# Patient Record
Sex: MALE | Race: WHITE | URBAN - METROPOLITAN AREA
[De-identification: names, ages, dates, MRNs, and addresses within clinical notes are randomized per-mention and may not be internally consistent; named-entity substitution may affect disease eponyms.]

---

## 2024-04-09 ENCOUNTER — OFFICE VISIT (OUTPATIENT)
Dept: INTERNAL MEDICINE CLINIC | Facility: CLINIC | Age: 49
End: 2024-04-09
Payer: COMMERCIAL

## 2024-04-09 VITALS
OXYGEN SATURATION: 98 % | HEART RATE: 79 BPM | SYSTOLIC BLOOD PRESSURE: 132 MMHG | BODY MASS INDEX: 27.85 KG/M2 | DIASTOLIC BLOOD PRESSURE: 88 MMHG | WEIGHT: 217 LBS | HEIGHT: 74 IN

## 2024-04-09 DIAGNOSIS — Z00.00 ANNUAL PHYSICAL EXAM: Primary | ICD-10-CM

## 2024-04-09 DIAGNOSIS — G47.33 OSA (OBSTRUCTIVE SLEEP APNEA): ICD-10-CM

## 2024-04-09 DIAGNOSIS — D22.9 MULTIPLE PIGMENTED NEVI: ICD-10-CM

## 2024-04-09 DIAGNOSIS — R03.0 ELEVATED BLOOD PRESSURE READING: ICD-10-CM

## 2024-04-09 DIAGNOSIS — Z13.6 SCREENING FOR HEART DISEASE: ICD-10-CM

## 2024-04-09 DIAGNOSIS — Z80.42 FAMILY HISTORY OF PROSTATE CANCER: ICD-10-CM

## 2024-04-09 PROCEDURE — 3008F BODY MASS INDEX DOCD: CPT | Performed by: INTERNAL MEDICINE

## 2024-04-09 PROCEDURE — 99386 PREV VISIT NEW AGE 40-64: CPT | Performed by: INTERNAL MEDICINE

## 2024-04-09 PROCEDURE — 90471 IMMUNIZATION ADMIN: CPT | Performed by: INTERNAL MEDICINE

## 2024-04-09 PROCEDURE — 3075F SYST BP GE 130 - 139MM HG: CPT | Performed by: INTERNAL MEDICINE

## 2024-04-09 PROCEDURE — 3079F DIAST BP 80-89 MM HG: CPT | Performed by: INTERNAL MEDICINE

## 2024-04-09 PROCEDURE — 90715 TDAP VACCINE 7 YRS/> IM: CPT | Performed by: INTERNAL MEDICINE

## 2024-04-09 NOTE — PROGRESS NOTES
Nader Guthrie is a 48 year old male.    Chief complaint: annual physical exam       HPI:     Nader Guthrie is a 48 year old pleasant male who presents for annual physical exam         No chest pain no sob no abdominal pain  No diarrhea or constipation   No fever or chills   No urinary complaints        Had a colonoscopy done over 3 years ago   Was found to have polyps   Was advised to repeat in 3 years         Palpitations at times         JS   Had testing done 2020   Was found to have mild sleep apnea   Not on cpap machine   Did loose weight since that time             No smoking   Alcohol : 1-2 times per week   Exercise : 6 days per week       Family history  Dad : Prostate cancer   Mom  of breast cancer  Breast cancer on mom's side     Dad had cardiac issues he is going through mitral valve assessment / evaluation of valve surgery         No current outpatient medications on file.      History reviewed. No pertinent past medical history.  History reviewed. No pertinent surgical history.          Family History   Problem Relation Age of Onset    Cancer Father     Cancer Mother     Cancer Paternal Grandmother      There is no problem list on file for this patient.      REVIEW OF SYSTEMS:   A comprehensive 10 point review of systems was completed.  Pertinent positives and negatives noted in the the HPI            EXAM:   /88   Pulse 79   Ht 6' 2\" (1.88 m)   Wt 217 lb (98.4 kg)   SpO2 98%   BMI 27.86 kg/m²   GENERAL: well developed, well nourished,in no apparent distress  SKIN: multiple pigmented nevi   HEENT: atraumatic, normocephalic,ears and throat are clear  NECK: supple,no adenopathy  LUNGS: clear to auscultation  CARDIO: RRR without murmur  GI: no masses, HSM or tenderness  EXTREMITIES: no cyanosis, clubbing or edema  NEURO: no gross deficits              No orders of the defined types were placed in this encounter.    No results found.         ASSESSMENT AND PLAN:       ICD-10-CM    1. Annual  physical exam  Z00.00 CBC With Differential With Platelet     Comp Metabolic Panel (14)     Lipid Panel     Hemoglobin A1C     TSH W Reflex To Free T4     Vitamin D     PSA (Screening) [E]     TdaP (Adacel, Boostrix) [[id number]]     CT CALCIUM SCORING     Pulmonary Referral - In Network     Genetic Counselor Referral - Douglas Drummond)     Derm Referral - In Network     Gastro Referral - In Network     EKG 12 Lead to be performed at [facility]     Urinalysis with Culture Reflex [E]      2. Family history of prostate cancer  Z80.42 CBC With Differential With Platelet     Comp Metabolic Panel (14)     Lipid Panel     Hemoglobin A1C     TSH W Reflex To Free T4     Vitamin D     PSA (Screening) [E]     TdaP (Adacel, Boostrix) [[id number]]     CT CALCIUM SCORING     Pulmonary Referral - In Network     Genetic Counselor Referral - Douglas Drummond)     Derm Referral - In Network     Gastro Referral - In Network     EKG 12 Lead to be performed at [facility]     Urinalysis with Culture Reflex [E]      3. Multiple pigmented nevi  D22.9 CBC With Differential With Platelet     Comp Metabolic Panel (14)     Lipid Panel     Hemoglobin A1C     TSH W Reflex To Free T4     Vitamin D     PSA (Screening) [E]     TdaP (Adacel, Boostrix) [[id number]]     CT CALCIUM SCORING     Pulmonary Referral - In Network     Genetic Counselor Referral - Douglas Drummond)     Derm Referral - In Network     Gastro Referral - In Network     EKG 12 Lead to be performed at [facility]     Urinalysis with Culture Reflex [E]      4. Screening for heart disease  Z13.6 CBC With Differential With Platelet     Comp Metabolic Panel (14)     Lipid Panel     Hemoglobin A1C     TSH W Reflex To Free T4     Vitamin D     PSA (Screening) [E]     TdaP (Adacel, Boostrix) [[id number]]     CT CALCIUM SCORING     Pulmonary Referral - In Network     Genetic Counselor Referral - Douglas Drummond)     Derm  Referral - In Network     Gastro Referral - In Network     EKG 12 Lead to be performed at [facility]     Urinalysis with Culture Reflex [E]      5. Elevated blood pressure reading  R03.0 CBC With Differential With Platelet     Comp Metabolic Panel (14)     Lipid Panel     Hemoglobin A1C     TSH W Reflex To Free T4     Vitamin D     PSA (Screening) [E]     TdaP (Adacel, Boostrix) [[id number]]     CT CALCIUM SCORING     Pulmonary Referral - In Network     Genetic Counselor Referral - [facility] (Adry Drummond)     Derm Referral - In Network     Gastro Referral - In Network     EKG 12 Lead to be performed at [facility]     Urinalysis with Culture Reflex [E]      6. JS (obstructive sleep apnea)  G47.33 CBC With Differential With Platelet     Comp Metabolic Panel (14)     Lipid Panel     Hemoglobin A1C     TSH W Reflex To Free T4     Vitamin D     PSA (Screening) [E]     TdaP (Adacel, Boostrix) [[id number]]     CT CALCIUM SCORING     Pulmonary Referral - In Network     Genetic Counselor Referral - Artemus (Adry Drummond)     Derm Referral - In Network     Gastro Referral - In Network     EKG 12 Lead to be performed at [facility]     Urinalysis with Culture Reflex [E]       Diet and exercise   Sun screen recommended   Fasting blood work   Tdap today   Referral to gi for screening colonoscopy   Referral to derm for general skin screening   Referral to sleep specialist for revaluation of JS   Advised to check BP daily and send me the log in 1 week   Can bring his machine to compare to manual blood pressure check   CT calcium score recommended   He is interested in genetic testing : will refer to genetic counselor       Please return to the clinic if you are having persistent symptoms. If worsening symptoms should go to the ER    Rebecca Montiel MD,   Diplomate of the American Board of Internal Medicine  Diplomate of the American Board of Obesity Medicine

## 2024-06-24 ENCOUNTER — LAB ENCOUNTER (OUTPATIENT)
Dept: LAB | Age: 49
End: 2024-06-24
Attending: INTERNAL MEDICINE

## 2024-06-24 ENCOUNTER — LAB ENCOUNTER (OUTPATIENT)
Dept: LAB | Facility: HOSPITAL | Age: 49
End: 2024-06-24
Attending: INTERNAL MEDICINE

## 2024-06-24 DIAGNOSIS — R79.89 ABNORMAL CBC: ICD-10-CM

## 2024-06-24 DIAGNOSIS — Z00.00 ANNUAL PHYSICAL EXAM: ICD-10-CM

## 2024-06-24 DIAGNOSIS — R17 ELEVATED BILIRUBIN: ICD-10-CM

## 2024-06-24 DIAGNOSIS — R03.0 ELEVATED BLOOD PRESSURE READING: ICD-10-CM

## 2024-06-24 DIAGNOSIS — D22.9 MULTIPLE PIGMENTED NEVI: ICD-10-CM

## 2024-06-24 DIAGNOSIS — G47.33 OSA (OBSTRUCTIVE SLEEP APNEA): ICD-10-CM

## 2024-06-24 DIAGNOSIS — Z13.6 SCREENING FOR HEART DISEASE: ICD-10-CM

## 2024-06-24 DIAGNOSIS — Z80.42 FAMILY HISTORY OF PROSTATE CANCER: ICD-10-CM

## 2024-06-24 DIAGNOSIS — Z00.00 ROUTINE GENERAL MEDICAL EXAMINATION AT A HEALTH CARE FACILITY: ICD-10-CM

## 2024-06-24 DIAGNOSIS — Z00.00 ROUTINE GENERAL MEDICAL EXAMINATION AT A HEALTH CARE FACILITY: Primary | ICD-10-CM

## 2024-06-24 LAB
ALBUMIN SERPL-MCNC: 4.6 G/DL (ref 3.2–4.8)
ALBUMIN/GLOB SERPL: 1.9 {RATIO} (ref 1–2)
ALP LIVER SERPL-CCNC: 42 U/L
ALT SERPL-CCNC: 13 U/L
ANION GAP SERPL CALC-SCNC: 4 MMOL/L (ref 0–18)
AST SERPL-CCNC: 19 U/L (ref ?–34)
ATRIAL RATE: 64 BPM
BASOPHILS # BLD AUTO: 0.04 X10(3) UL (ref 0–0.2)
BASOPHILS NFR BLD AUTO: 1.3 %
BILIRUB DIRECT SERPL-MCNC: 0.9 MG/DL (ref ?–0.3)
BILIRUB SERPL-MCNC: 2.7 MG/DL (ref 0.3–1.2)
BILIRUB UR QL: NEGATIVE
BUN BLD-MCNC: 13 MG/DL (ref 9–23)
BUN/CREAT SERPL: 13.1 (ref 10–20)
CALCIUM BLD-MCNC: 9.4 MG/DL (ref 8.7–10.4)
CHLORIDE SERPL-SCNC: 109 MMOL/L (ref 98–112)
CHOLEST SERPL-MCNC: 180 MG/DL (ref ?–200)
CLARITY UR: CLEAR
CO2 SERPL-SCNC: 27 MMOL/L (ref 21–32)
COMPLEXED PSA SERPL-MCNC: 1.11 NG/ML (ref ?–4)
CREAT BLD-MCNC: 0.99 MG/DL
DEPRECATED RDW RBC AUTO: 43.7 FL (ref 35.1–46.3)
EGFRCR SERPLBLD CKD-EPI 2021: 93 ML/MIN/1.73M2 (ref 60–?)
EOSINOPHIL # BLD AUTO: 0.12 X10(3) UL (ref 0–0.7)
EOSINOPHIL NFR BLD AUTO: 3.8 %
ERYTHROCYTE [DISTWIDTH] IN BLOOD BY AUTOMATED COUNT: 12.6 % (ref 11–15)
EST. AVERAGE GLUCOSE BLD GHB EST-MCNC: 91 MG/DL (ref 68–126)
FASTING PATIENT LIPID ANSWER: YES
FASTING STATUS PATIENT QL REPORTED: YES
GLOBULIN PLAS-MCNC: 2.4 G/DL (ref 2–3.5)
GLUCOSE BLD-MCNC: 84 MG/DL (ref 70–99)
GLUCOSE UR-MCNC: NORMAL MG/DL
HBA1C MFR BLD: 4.8 % (ref ?–5.7)
HCT VFR BLD AUTO: 40.7 %
HDLC SERPL-MCNC: 64 MG/DL (ref 40–59)
HGB BLD-MCNC: 14.1 G/DL
HGB UR QL STRIP.AUTO: NEGATIVE
IMM GRANULOCYTES # BLD AUTO: 0.01 X10(3) UL (ref 0–1)
IMM GRANULOCYTES NFR BLD: 0.3 %
KETONES UR-MCNC: NEGATIVE MG/DL
LDLC SERPL CALC-MCNC: 106 MG/DL (ref ?–100)
LEUKOCYTE ESTERASE UR QL STRIP.AUTO: NEGATIVE
LYMPHOCYTES # BLD AUTO: 1.16 X10(3) UL (ref 1–4)
LYMPHOCYTES NFR BLD AUTO: 36.7 %
MCH RBC QN AUTO: 32.6 PG (ref 26–34)
MCHC RBC AUTO-ENTMCNC: 34.6 G/DL (ref 31–37)
MCV RBC AUTO: 94.2 FL
MONOCYTES # BLD AUTO: 0.33 X10(3) UL (ref 0.1–1)
MONOCYTES NFR BLD AUTO: 10.4 %
NEUTROPHILS # BLD AUTO: 1.5 X10 (3) UL (ref 1.5–7.7)
NEUTROPHILS # BLD AUTO: 1.5 X10(3) UL (ref 1.5–7.7)
NEUTROPHILS NFR BLD AUTO: 47.5 %
NITRITE UR QL STRIP.AUTO: NEGATIVE
NONHDLC SERPL-MCNC: 116 MG/DL (ref ?–130)
OSMOLALITY SERPL CALC.SUM OF ELEC: 289 MOSM/KG (ref 275–295)
P AXIS: 21 DEGREES
P-R INTERVAL: 146 MS
PH UR: 6 [PH] (ref 5–8)
PLATELET # BLD AUTO: 200 10(3)UL (ref 150–450)
POTASSIUM SERPL-SCNC: 4.4 MMOL/L (ref 3.5–5.1)
PROT SERPL-MCNC: 7 G/DL (ref 5.7–8.2)
PROT UR-MCNC: NEGATIVE MG/DL
Q-T INTERVAL: 430 MS
QRS DURATION: 90 MS
QTC CALCULATION (BEZET): 443 MS
R AXIS: 28 DEGREES
RBC # BLD AUTO: 4.32 X10(6)UL
SODIUM SERPL-SCNC: 140 MMOL/L (ref 136–145)
SP GR UR STRIP: 1.01 (ref 1–1.03)
T AXIS: 66 DEGREES
TRIGL SERPL-MCNC: 52 MG/DL (ref 30–149)
TSI SER-ACNC: 4.65 MIU/ML (ref 0.55–4.78)
UROBILINOGEN UR STRIP-ACNC: NORMAL
VENTRICULAR RATE: 64 BPM
VIT D+METAB SERPL-MCNC: 32.2 NG/ML (ref 30–100)
VLDLC SERPL CALC-MCNC: 9 MG/DL (ref 0–30)
WBC # BLD AUTO: 3.2 X10(3) UL (ref 4–11)

## 2024-06-24 PROCEDURE — 84153 ASSAY OF PSA TOTAL: CPT | Performed by: INTERNAL MEDICINE

## 2024-06-24 PROCEDURE — 83036 HEMOGLOBIN GLYCOSYLATED A1C: CPT | Performed by: INTERNAL MEDICINE

## 2024-06-24 PROCEDURE — 82306 VITAMIN D 25 HYDROXY: CPT | Performed by: INTERNAL MEDICINE

## 2024-06-24 PROCEDURE — 93005 ELECTROCARDIOGRAM TRACING: CPT

## 2024-06-24 PROCEDURE — 93010 ELECTROCARDIOGRAM REPORT: CPT | Performed by: INTERNAL MEDICINE

## 2024-06-24 PROCEDURE — 80061 LIPID PANEL: CPT | Performed by: INTERNAL MEDICINE

## 2024-06-24 PROCEDURE — 81003 URINALYSIS AUTO W/O SCOPE: CPT | Performed by: INTERNAL MEDICINE

## 2024-06-24 PROCEDURE — 82248 BILIRUBIN DIRECT: CPT | Performed by: INTERNAL MEDICINE

## 2024-06-24 PROCEDURE — 80050 GENERAL HEALTH PANEL: CPT | Performed by: INTERNAL MEDICINE

## 2024-06-25 DIAGNOSIS — R74.8 ELEVATED LIVER ENZYMES: Primary | ICD-10-CM

## 2024-06-25 DIAGNOSIS — R79.89 ABNORMAL CBC: ICD-10-CM

## 2024-06-27 ENCOUNTER — NURSE ONLY (OUTPATIENT)
Dept: HEMATOLOGY/ONCOLOGY | Facility: HOSPITAL | Age: 49
End: 2024-06-27
Attending: GENETIC COUNSELOR, MS

## 2024-06-27 ENCOUNTER — GENETICS ENCOUNTER (OUTPATIENT)
Dept: GENETICS | Facility: HOSPITAL | Age: 49
End: 2024-06-27
Attending: GENETIC COUNSELOR, MS

## 2024-06-27 ENCOUNTER — APPOINTMENT (OUTPATIENT)
Dept: GENETICS | Facility: HOSPITAL | Age: 49
End: 2024-06-27
Attending: GENETIC COUNSELOR, MS
Payer: COMMERCIAL

## 2024-06-27 DIAGNOSIS — Z80.3 FAMILY HISTORY OF BREAST CANCER IN MOTHER: Primary | ICD-10-CM

## 2024-06-27 DIAGNOSIS — Z80.42 FAMILY HISTORY OF PROSTATE CANCER: ICD-10-CM

## 2024-06-27 DIAGNOSIS — Z84.81 FAMILY HISTORY OF GENETIC DISEASE CARRIER: ICD-10-CM

## 2024-06-27 PROCEDURE — 36415 COLL VENOUS BLD VENIPUNCTURE: CPT

## 2024-06-27 PROCEDURE — 96040 HC GENETIC COUNSELING EA 30 MIN: CPT | Performed by: GENETIC COUNSELOR, MS

## 2024-06-27 NOTE — PROGRESS NOTES
Reason for visit: Mr. Guthrie is a 48-year-old gentleman with a family history of cancer on both sides of his family.  He shares that his maternal aunts were reportedly found to be positive for a BRCA mutation through genetic testing due to the family history of cancer. He is now interested in pursuing genetic testing for the familial mutation in order to guide his medical care and for information for his daughter. Mr. 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  He has two teen-aged children.   Relevant family history: Mr. Guthrie explains that his mother was diagnosed with breast cancer at age 34 and passed away at age 36.  Her two sisters reportedly underwent genetic testing with positive results within one of the BRCA genes.  He does not have access to either of his maternal aunts' test reports.  Both of these aunts have reportedly undergone risk-reducing surgeries.  Other cancers on his maternal side include his maternal grandfather with throat cancer at age 90 (he was a non-smoker) and his maternal grandmother (unknown type).  On his paternal side, his father was diagnosed with prostate cancer at age 76 and more recently, at age 79, with leukemia.  His paternal grandmother was diagnosed with an unknown malignancy.  He is unaware of any other malignancies on either side of her family and is unaware of other family members who have undergone genetic testing to date.   His heritage on his paternal side is [place]  and on his maternal side is  and partial Ashkenazi Hindu heritage.  He denies any knowledge of consanguinity. His children (a daughter and a son) are healthy and well by his report.  Medical history: Mr. Guthrie reports that overall he is in good health. He has had a colonoscopy with several polyps identified and is aware that he is overdue for another one.  His PSA results have been normal to date.  He denies any history of thyroid issues or current dermatological  concerns. He denies any tobacco use but admits to consumption of 5-10 alcoholic beverages weekly.    Summary:   We discussed hereditary breast cancer with Mr. uGthrie because of his family history. Most breast or ovarian cancer is not hereditary; however, hereditary cancer is suspected under certain conditions, such as when breast cancer occurs prior to the age of 50 (or premenopausal), when there are multiple affected family members, when breast cancer occurs in combination with other cancers, especially ovarian cancer, and when cancer appears to be passing from generation to generation, or when an individual has more than one cancer diagnosed.  We discussed dominant inheritance, the pattern in which most hereditary cancer conditions are inherited. If an individual has such a cancer predisposing gene mutation, there is a 50% chance that any offspring will not inherit the mutation and a 50% chance that he or she will inherit it. Inheriting the mutation does not automatically mean that one will develop cancer, rather that there is an increased chance for developing certain types of cancer. Cancer predisposing gene mutations can exist in males and females and can be passed on to both male and female offspring. Given the available information about his maternal aunt with a known BRCA mutation, this gives him a 25% chance of being a carrier of the same mutation.   The pros and cons of cancer predisposing gene mutation testing were reviewed with Mr. Guthrie. Genetic test results can have significant impact on medical management, planning, screening, surgical decision-making, cancer risk assessment for the patient and relatives, and psychological/emotional well-being. Mutations in the genes BRCA1 and BRCA2 account for most (but not all) cases of hereditary breast cancer. Mutations in other genes have also been associated with an increased risk for breast or ovarian cancer - but mutations in these other genes are  statistically less often seen in hereditary breast or ovarian cancer.   We discussed the benefits and limitations of testing for only the known familial BRCA2 mutation vs. comprehensive BRCA1/2 testing versus multi-gene panels that include BRCA1/2. Panels are an appropriate option for individuals whose history is suggestive of more than one syndrome, and they improve detection rate for identifying the underlying cause of a hereditary cancer. Limitations of panels include an unknown percentage of variants of unknown significance, as well as an uncertainty of level of risk associated with certain low-penetrance genes, and therefore lack of clear guidelines for risk management of carriers of some of these mutations. There are panels that assess for mutations in only “high risk” and clinically actionable breast cancer genes as well as larger panels that assess for mutations in high, moderate and unknown-penetrance breast cancer genes. Genetic testing for either comprehensive BRCA1/2 analysis or a panel could yield one of three results: no mutation detected, deleterious mutation detected, or variant of uncertain significance. The implications of these potential results were reviewed with Mr. Guthrie. Conversely, testing for only the known familial mutation would yield either a positive (mutation detected) or negative (no mutation detected) result and gives less expensive, faster results. I encouraged her to attempt to obtain a copy of his maternal aunt's genetic testing report and reviewed the benefits of this, as it allows for complete interpretation of his results.  He is uncertain that he will be able to obtain this but will attempt it.    Additionally, we discussed the federal statutes protecting genetic information and non-discrimination for health insurance or employment (BUDDY) but we reviewed that life insurance, long term healthcare and disability benefits are not covered by these laws, and therefore, should he  be interested in obtaining coverage for any of these, he should do this prior to the testing.  Given the reported familial BRCA mutation as well as the early-onset breast cancer  and Ashkenazi Mosque heritage in his maternal family history, Mr. Guthrie does meet NCCN criteria for genetic testing.  After discussing the multiple testing options, Mr. Guthrie decided that he would like to pursue testing for a multi-gene panel as he is not certain of the gene his maternal aunt has a mutation in.  Blood was drawn and sent to [facility] for a comprehensive panel of genes with RNA analysis that have clinical management guidelines ([facility] Common Hereditary Cancers Panel+RNA).  Turn-around-time is approximately two to three weeks for the testing. Our office will call Mr. Guthrie as soon as results are received; post-test counseling can be scheduled at that time.  Plan:  Ordered [facility] Common Hereditary Cancers Panel+RNA (48 genes) through [facility].  The Genetics office will call Mr. Guthrie when results are received. Post-test counseling can be scheduled at that time.  Recommendations for Mr. Guthrie and family members will depend on above test results.    Thank you for referring Mr. Guthrie for genetic counseling; please do not hesitate to contact our office if you have any questions or concerns, [phone].  Time spent with patient: 50 minutes  CC: Dr. Montiel

## 2024-07-04 ENCOUNTER — TELEPHONE (OUTPATIENT)
Dept: GENETICS | Facility: HOSPITAL | Age: 49
End: 2024-07-04

## 2024-07-04 NOTE — TELEPHONE ENCOUNTER
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 Released results to him through lab's portal and will mail him a copy. Encouraged him to reach out to me with any questions and shared my contact information.

## 2024-07-08 ENCOUNTER — HOSPITAL ENCOUNTER (OUTPATIENT)
Dept: ULTRASOUND IMAGING | Age: 49
Discharge: HOME | End: 2024-07-08
Attending: INTERNAL MEDICINE
Payer: COMMERCIAL

## 2024-07-08 DIAGNOSIS — R74.8 ELEVATED LIVER ENZYMES: ICD-10-CM

## 2024-07-08 PROCEDURE — 76705 ECHO EXAM OF ABDOMEN: CPT | Performed by: INTERNAL MEDICINE

## 2024-08-15 ENCOUNTER — HOSPITAL ENCOUNTER (OUTPATIENT)
Dept: CT IMAGING | Facility: HOSPITAL | Age: 49
Discharge: HOME | End: 2024-08-15
Attending: INTERNAL MEDICINE

## 2024-08-15 ENCOUNTER — ANCILLARY ORDERS (OUTPATIENT)
Dept: INTERNAL MEDICINE CLINIC | Facility: CLINIC | Age: 49
End: 2024-08-15

## 2024-08-15 VITALS
BODY MASS INDEX: 27.85 KG/M2 | WEIGHT: 217 LBS | DIASTOLIC BLOOD PRESSURE: 80 MMHG | SYSTOLIC BLOOD PRESSURE: 150 MMHG | HEIGHT: 74.02 IN

## 2024-08-15 DIAGNOSIS — Z80.42 FAMILY HISTORY OF PROSTATE CANCER: ICD-10-CM

## 2024-08-15 DIAGNOSIS — Z00.00 ANNUAL PHYSICAL EXAM: Primary | ICD-10-CM

## 2024-08-15 DIAGNOSIS — D22.9 MULTIPLE PIGMENTED NEVI: ICD-10-CM

## 2024-08-15 DIAGNOSIS — R03.0 ELEVATED BLOOD PRESSURE READING: ICD-10-CM

## 2024-08-15 DIAGNOSIS — G47.33 OSA (OBSTRUCTIVE SLEEP APNEA): ICD-10-CM

## 2024-08-15 DIAGNOSIS — Z00.00 ANNUAL PHYSICAL EXAM: ICD-10-CM

## 2024-08-15 DIAGNOSIS — Z13.6 SCREENING FOR HEART DISEASE: ICD-10-CM

## 2024-08-15 DIAGNOSIS — R93.1 ELEVATED CORONARY ARTERY CALCIUM SCORE: ICD-10-CM

## 2024-08-15 NOTE — PROGRESS NOTES
Date of Service 8/15/2024    FRANCI VALDEZ  Date of Birth 11/17/1975    Patient Age: 48 year old    PCP: Rebecca Montiel MD  [address] [place] [id number]    Heart Scan Consult  Preliminary Heart Scan Score: 299    Previous Screening  Heart Scan Completed Previously: No        Peripheral Vascular Scan Completed Previously: No          Risk Factors  Personal Risk Factors  Non-alterable Risk Factors: Age;Gender;Family History (Maternal grandfather had bypass surgery later in life.)  Alterable Risk Factors: High Blood Pressure;Abnormal Cholesterol      Body Mass Index  Body mass index is 27.85 kg/m².    Blood Pressure     /80 (BP Location: Right arm)     (Normal =< 120/80,  Elevated = 120-129/ >80,  High Stage1 130-139/80-89 , Stage2 >140/>90)    Lipid Profile  Cholesterol: 180, done on 6/24/2024.  HDL Cholesterol: 64, done on 6/24/2024.  LDL Cholesterol: 106, done on 6/24/2024.  TriGlycerides 52, done on 6/24/2024.    Cholesterol Goals  Value   Total  =< 200   HDL  = > 45 Men = > 55 Women   LDL   =< 100   Triglycerides  =< 150       Glucose and Hemoglobin A1C  Lab Results   Component Value Date    GLU 84 06/24/2024    A1C 4.8 06/24/2024     (Normal Fasting Glucose < 100mg/dl )    Nurse Review  Risk factor information and results reviewed with Nurse: Yes    Recommended Follow Up:  Consult your physician regarding:: Final Heart Scan Report;Discuss potential for Incidental Finding;Discuss Potential for Score Variance      Recommendations for Change:  Nutrition Changes: Low Saturated Fat;Low Fat Dairy;Low Salt Eating;Increase Fiber (Eats beef about 3 times per week, chicken other days. Encouraged to switch out some beef for fish and lean proteins.)    Cholesterol Modification (goal of therapy depends upon your risk): Decrease LDL (Lousy/Bad) Ideal <100    Exercise: No Change Needed (Swims, weight training.)    Smoking Cessation: No Change Needed    Weight Management: Maintain Current Weight    Stress  Management: Adopt Stress Management Techniques    Repeat Heart Scan: Discuss with your Physician;3 Years if Calcium Score is > 0.0              [place] Recommended Resources:  Recommended Resources: Upcoming Classes, Medical Services and Health Library [email or web address]            Adriana HAWKINS RN        Please Contact the Nurse Heart Line with any Questions or Concerns [phone].

## 2024-08-20 DIAGNOSIS — Z82.49 FAMILY HISTORY OF HEART DISEASE: ICD-10-CM

## 2024-08-20 DIAGNOSIS — R93.1 ELEVATED CORONARY ARTERY CALCIUM SCORE: Primary | ICD-10-CM

## 2024-08-20 RX ORDER — LOSARTAN POTASSIUM 25 MG/1
25 TABLET ORAL DAILY
Qty: 90 TABLET | Refills: 1 | Status: SHIPPED | OUTPATIENT
Start: 2024-08-20 | End: 2024-11-18

## 2024-08-20 RX ORDER — ROSUVASTATIN CALCIUM 20 MG/1
20 TABLET, COATED ORAL NIGHTLY
Qty: 90 TABLET | Refills: 1 | Status: SHIPPED | OUTPATIENT
Start: 2024-08-20 | End: 2024-11-18

## 2024-08-28 ENCOUNTER — TELEPHONE (OUTPATIENT)
Facility: CLINIC | Age: 49
End: 2024-08-28

## 2024-08-28 ENCOUNTER — OFFICE VISIT (OUTPATIENT)
Facility: CLINIC | Age: 49
End: 2024-08-28

## 2024-08-28 VITALS
SYSTOLIC BLOOD PRESSURE: 145 MMHG | HEIGHT: 74 IN | DIASTOLIC BLOOD PRESSURE: 104 MMHG | HEART RATE: 73 BPM | BODY MASS INDEX: 27.85 KG/M2 | WEIGHT: 217 LBS

## 2024-08-28 DIAGNOSIS — K64.9 HEMORRHOIDS, UNSPECIFIED HEMORRHOID TYPE: ICD-10-CM

## 2024-08-28 DIAGNOSIS — Z12.11 COLON CANCER SCREENING: Primary | ICD-10-CM

## 2024-08-28 DIAGNOSIS — Z86.010 PERSONAL HISTORY OF COLONIC POLYPS: ICD-10-CM

## 2024-08-28 DIAGNOSIS — Z12.11 SCREEN FOR COLON CANCER: Primary | ICD-10-CM

## 2024-08-28 DIAGNOSIS — Z86.010 HISTORY OF COLON POLYPS: ICD-10-CM

## 2024-08-28 PROCEDURE — 3077F SYST BP >= 140 MM HG: CPT

## 2024-08-28 PROCEDURE — 3080F DIAST BP >= 90 MM HG: CPT

## 2024-08-28 PROCEDURE — 3008F BODY MASS INDEX DOCD: CPT

## 2024-08-28 PROCEDURE — S0285 CNSLT BEFORE SCREEN COLONOSC: HCPCS

## 2024-08-28 RX ORDER — SODIUM, POTASSIUM,MAG SULFATES 17.5-3.13G
SOLUTION, RECONSTITUTED, ORAL ORAL
Qty: 1 EACH | Refills: 0 | Status: SHIPPED | OUTPATIENT
Start: 2024-08-28

## 2024-08-28 RX ORDER — HYDROCORTISONE ACETATE 25 MG/1
25 SUPPOSITORY RECTAL 2 TIMES DAILY
Qty: 28 SUPPOSITORY | Refills: 0 | Status: SHIPPED | OUTPATIENT
Start: 2024-08-28 | End: 2024-09-11

## 2024-08-28 NOTE — PATIENT INSTRUCTIONS
1. Schedule colonoscopy with Dr. Flowers  Diagnosis: CRC screen, personal hx of colon polyps, hemorrhoids  Sedation: MAC or IV  Prep: split dose suprep    2.  bowel prep from pharmacy   You can pick the bowel prep up now and store in a cool, dry place in your home until your scheduled bowel prep start date.    3. Continue all medications as normal for your procedure. DO NOT TAKE: Any form of alcohol, recreational drugs and any forms of erectile dysfunction medications 24 hours prior to procedure.    4. Read all bowel prep instructions carefully. Bowel prep instructions can also be found online at:  [email or web address]     5. AVOID seeds, nuts, popcorn, raw fruits and vegetables for 5 days before procedure    6. If you start any NEW medication after your visit today, please notify us. Certain medications (like iron or weight loss medications) will need to be held before the procedure, or the procedure cannot be performed safely.     -------------------------------------------------------------------------------------------------  -hydrocortisone suppositories 2x daily for 1-2 weeks    -call to make appt with colo-rectal surgery Dr. Acuna #[phone]

## 2024-08-28 NOTE — TELEPHONE ENCOUNTER
Scheduled for:  Colonoscopy 35842  Provider Name:  Dr. Flowers  Date:  1/10/2025  Location:  [facility]  Sedation:  MAC  Time:  8:15 am, (pt is aware that [facility] will call the day before to confirm arrival time)  Prep:  Suprep  Meds/Allergies Reconciled?:  DAO Armas reviewed  Diagnosis with codes:  Screening for colon cancer Z12.11; Hx of colon polyps Z86.010  Was patient informed to call insurance with codes (Y/N):  Yes  Referral sent?:  Referral was sent at the time of electronic surgical scheduling.  EM or [facility] notified?:  I sent an electronic request to Endo Scheduling and received a confirmation today.    Medication Orders:  N/A  Misc Orders:  N/A     Further instructions given by staff:  Prep instructions were given to pt in the office, pt verbalized understanding.

## 2024-08-28 NOTE — H&P
[facility] - Gastroenterology                                                                                                  Clinic History and Physical     Chief Complaint   Patient presents with    Colonoscopy Screening     Moved from [place] last year; had colonoscopy in 2019; hx of colon polyps        Requesting physician or provider: Rebecca Montiel MD    HPI:   Nader Guthrie is a 48 year old year-old male with active diagnoses including elevated blood pressure reading. Prior medical/surgical hx in note table. The patient presents for colon cancer screening evaluation.    #family history of breast & other cancers  -pt saw genetics provider on 6/27 and underwent genetic testing for breast cancer family history     #elevated bilirubin  -bilirubin 2.7 on 6/24, US liver 7/8 was unremarkable. Follow up hepatic function panel ordered by PCP     #hemorrhoids  -reports the past 4 years internal hemorrhoid which intermittently will prolapse, able to push back in. Occasional drops of blood on tissue. Has used hemorrhoid suppository without improvement. No current blood or pain    Patient is here today as a referral from his PCP for evaluation prior to undergoing colonoscopy for CRC screening. Patient denies any GI symptoms of nausea, vomiting, heartburn, dyspepsia, dysphagia, hematemesis, abdominal pain, change in bowel habits, constipation, diarrhea, hematochezia, or melena.  Additionally there is no unintentional weight loss.    Pt is due for CRC screening. We discussed the available screening options for CRC such as FIT and cologuard. They are electing to pursue colonoscopy at this time.     Last colonoscopy: 2019 in [place]   -polyps per patient, 3 year recall due to size  Last EGD:  none     NSAIDS: none   Tobacco: none   Alcohol: occasional  Marijuana: none currently  Illicit drugs: none     FH GI malignancy: none     No history of adverse reaction to sedation  No JS  No anticoagulants/antiplatelet  No  pacemaker/defibrillator  No pain medications and/or sleep aides    Wt Readings from Last 6 Encounters:   24 217 lb (98.4 kg)   08/15/24 217 lb (98.4 kg)   24 217 lb (98.4 kg)          History, Medications, Allergies, ROS:      Past Medical History:    Colon polyp    Polyps removed during colonoscopy in 2019    Hemorrhoids    Hemorrhoids inside rectum      Past Surgical History:   Procedure Laterality Date    Colonoscopy        Family Hx:   Family History   Problem Relation Age of Onset    Cancer Father 76        Acute Leukemia (active); Prostate Cancer (active)    Colon Polyps Father     Cancer Mother 34        Breast Cancer ()    Cancer Maternal Grandmother         unknown    Cancer Maternal Grandfather 90        Throat Cancer ()    Cancer Paternal Grandmother         unknown type    Genetic Disease Maternal Aunt         reportedly BRCA+ (did RRM)    Genetic Disease Maternal Aunt         reportedly BRCA+ (did RRM)      Social History:   Social History     Socioeconomic History    Marital status:    Tobacco Use    Smoking status: Never    Smokeless tobacco: Never   Substance and Sexual Activity    Alcohol use: Yes     Alcohol/week: 5.0 standard drinks of alcohol     Types: 5 Standard drinks or equivalent per week     Comment: occ    Drug use: Not Currently     Types: Cannabis        Medications (Active prior to today's visit):  Current Outpatient Medications   Medication Sig Dispense Refill    Na Sulfate-K Sulfate-Mg Sulf (SUPREP BOWEL PREP KIT) 17.5-3.13-1.6 GM/177ML Oral Solution Take as directed by GI clinic. Okay to substitute for generic. 1 each 0    hydrocortisone 25 MG Rectal Suppos Place 1 suppository (25 mg total) rectally 2 (two) times daily for 14 days. 28 suppository 0    rosuvastatin 20 MG Oral Tab Take 1 tablet (20 mg total) by mouth nightly. 90 tablet 1    losartan 25 MG Oral Tab Take 1 tablet (25 mg total) by mouth daily. 90 tablet 1       Allergies:  No Known  Allergies    ROS:   CONSTITUTIONAL: negative for fevers, chills, sweats  EYES Negative for scleral icterus or redness, and diplopia  HEENT: Negative for hoarseness  RESPIRATORY: Negative for cough and severe shortness of breath  CARDIOVASCULAR: Negative for crushing sub-sternal chest pain  GASTROINTESTINAL: See HPI  GENITOURINARY: Negative for dysuria  MUSCULOSKELETAL: Negative for arthralgias and myalgias  SKIN: Negative for jaundice, rash or pruritus  NEUROLOGICAL: Negative for dizziness and headaches  BEHAVIOR/PSYCH: Negative for psychotic behavior      PHYSICAL EXAM:   Blood pressure (!) 145/104, pulse 73, height 6' 2\" (1.88 m), weight 217 lb (98.4 kg).    GEN: Alert, no acute distress, well-nourished   HEENT: anicteric sclera, neck supple, trachea midline, MMM, no palpable or tender neck or supraclavicular lymph nodes  CV: RRR, the extremities are warm and well perfused   LUNGS: No increased work of breathing, CTAB  ABDOMEN: Soft, symmetrical, non-tender without distention or guarding. No scars or lesions. Aorta is without bruit or visible pulsation. Umbilicus is midline without herniation. Normoactive bowel sounds are present, No masses, hepatomegaly or splenomegaly noted.  MSK: No erythema, no warmth, no swelling of joints  SKIN: No jaundice, no erythema, no rashes, no lesions  HEMATOLOGIC: No bleeding, no bruising  NEURO: Alert and interactive, FELDMAN  PSYCH: appropriate mood & affect    Labs/Imaging:     Patient's labs and imaging were reviewed and discussed with patient today.    .  ASSESSMENT/PLAN:   Nader Guthrie is a 48 year old year-old male with active diagnoses including elevated blood pressure reading. Prior medical/surgical hx in note table. The patient presents for colon cancer screening evaluation.    #family history of breast & other cancers  -pt saw genetics provider on 6/27 and underwent genetic testing for breast cancer family history     #elevated bilirubin  -bilirubin 2.7 on 6/24, US  liver 7/8 was unremarkable. Follow up hepatic function panel ordered by PCP  -advised patient to follow up if continuing to rise. No abdominal pain     #hemorrhoids  -reports the past 4 years internal hemorrhoid which intermittently will prolapse, able to push back in. Occasional drops of blood on tissue. Has used hemorrhoid suppository without improvement. No current blood or pain  -will trial hydrocortisone suppositories, referral placed for colorectal surgery    #colorectal cancer screening: patient is considered average risk for colon cancer (No immediate family hx of colon cancer) and it is appropriate to proceed with screening colonoscopy. Patient is currently asymptomatic and denies diarrhea, hematochezia, thin-stools or weight loss. We discussed risks/benefits/alternatives to procedure, including stool testing, they want to proceed with colonoscopy.  No anemia noted on blood work 6/24.  -hx polyps on 2019 colonoscopy     Recommend:  -hydrocortisone suppositories 2x daily for 1-2 weeks    -call to make appt with colo-rectal surgery Dr. Acuna #[phone]    -------------------------------------------------------------------------------------------------    -Schedule colonoscopy with Dr. Flowers per request  Diagnosis: CRC screen, personal hx of colon polyps, hemorrhoids  Sedation: MAC or IV  Prep: split dose suprep    -Anti-platelets and anti-coagulants: N/A   -Diabetes meds: N/A     Endoscopy risk/benefit discussion: I have thoroughly discussed the risks, benefits, and alternatives of endoscopic evaluation with the patient, who demonstrated understanding. This includes the potential risks of bleeding, infection, pain, anesthesia complications, and perforation, which may result in prolonged hospitalization or surgical intervention. All of the patient’s questions were addressed to their satisfaction. The patient has chosen to proceed with the endoscopic procedure, including any necessary interventions  such as polypectomy, biopsy, control of bleeding.      Orders This Visit:  No orders of the defined types were placed in this encounter.      Meds This Visit:  Requested Prescriptions     Signed Prescriptions Disp Refills    Na Sulfate-K Sulfate-Mg Sulf (SUPREP BOWEL PREP KIT) 17.5-3.13-1.6 GM/177ML Oral Solution 1 each 0     Sig: Take as directed by GI clinic. Okay to substitute for generic.    hydrocortisone 25 MG Rectal Suppos 28 suppository 0     Sig: Place 1 suppository (25 mg total) rectally 2 (two) times daily for 14 days.       Imaging & Referrals:  SURGERY - INTERNAL       DAO Bernstein    [facility]astroenterology  8/28/2024      The dictation was partially prepared using Dragon Medical voice recognition software. As a result, errors may occur. When identified, these errors have been corrected. While every attempt is made to correct errors during dictation, discrepancies may still exist.

## 2024-09-27 ENCOUNTER — LAB ENCOUNTER (OUTPATIENT)
Dept: LAB | Age: 49
End: 2024-09-27
Attending: INTERNAL MEDICINE
Payer: COMMERCIAL

## 2024-09-27 DIAGNOSIS — R17 ELEVATED BILIRUBIN: ICD-10-CM

## 2024-09-27 DIAGNOSIS — R79.89 ABNORMAL CBC: ICD-10-CM

## 2024-09-27 DIAGNOSIS — Z00.00 ROUTINE GENERAL MEDICAL EXAMINATION AT A HEALTH CARE FACILITY: ICD-10-CM

## 2024-09-27 DIAGNOSIS — R74.8 ELEVATED LIVER ENZYMES: ICD-10-CM

## 2024-09-27 LAB
ALBUMIN SERPL-MCNC: 4.7 G/DL (ref 3.2–4.8)
ALP LIVER SERPL-CCNC: 43 U/L
ALT SERPL-CCNC: 28 U/L
AST SERPL-CCNC: 51 U/L (ref ?–34)
BASOPHILS # BLD AUTO: 0.03 X10(3) UL (ref 0–0.2)
BASOPHILS NFR BLD AUTO: 0.7 %
BILIRUB DIRECT SERPL-MCNC: 0.8 MG/DL (ref ?–0.3)
BILIRUB SERPL-MCNC: 3.2 MG/DL (ref 0.3–1.2)
DEPRECATED RDW RBC AUTO: 43.8 FL (ref 35.1–46.3)
EOSINOPHIL # BLD AUTO: 0.15 X10(3) UL (ref 0–0.7)
EOSINOPHIL NFR BLD AUTO: 3.6 %
ERYTHROCYTE [DISTWIDTH] IN BLOOD BY AUTOMATED COUNT: 12.8 % (ref 11–15)
FOLATE SERPL-MCNC: 12.9 NG/ML (ref 5.4–?)
HCT VFR BLD AUTO: 43.1 %
HGB BLD-MCNC: 14.8 G/DL
IMM GRANULOCYTES # BLD AUTO: 0.01 X10(3) UL (ref 0–1)
IMM GRANULOCYTES NFR BLD: 0.2 %
LYMPHOCYTES # BLD AUTO: 1.31 X10(3) UL (ref 1–4)
LYMPHOCYTES NFR BLD AUTO: 31.6 %
MCH RBC QN AUTO: 32.5 PG (ref 26–34)
MCHC RBC AUTO-ENTMCNC: 34.3 G/DL (ref 31–37)
MCV RBC AUTO: 94.5 FL
MONOCYTES # BLD AUTO: 0.33 X10(3) UL (ref 0.1–1)
MONOCYTES NFR BLD AUTO: 8 %
NEUTROPHILS # BLD AUTO: 2.31 X10 (3) UL (ref 1.5–7.7)
NEUTROPHILS # BLD AUTO: 2.31 X10(3) UL (ref 1.5–7.7)
NEUTROPHILS NFR BLD AUTO: 55.9 %
PLATELET # BLD AUTO: 224 10(3)UL (ref 150–450)
PROT SERPL-MCNC: 7.4 G/DL (ref 5.7–8.2)
RBC # BLD AUTO: 4.56 X10(6)UL
VIT B12 SERPL-MCNC: 527 PG/ML (ref 211–911)
WBC # BLD AUTO: 4.1 X10(3) UL (ref 4–11)

## 2024-09-27 PROCEDURE — 82607 VITAMIN B-12: CPT

## 2024-09-27 PROCEDURE — 82746 ASSAY OF FOLIC ACID SERUM: CPT

## 2024-09-27 PROCEDURE — 80076 HEPATIC FUNCTION PANEL: CPT

## 2024-09-27 PROCEDURE — 36415 COLL VENOUS BLD VENIPUNCTURE: CPT

## 2024-09-27 PROCEDURE — 85025 COMPLETE CBC W/AUTO DIFF WBC: CPT

## 2024-10-03 ENCOUNTER — TELEPHONE (OUTPATIENT)
Facility: CLINIC | Age: 49
End: 2024-10-03

## 2024-10-03 DIAGNOSIS — R17 ELEVATED BILIRUBIN: Primary | ICD-10-CM

## 2024-10-18 ENCOUNTER — OFFICE VISIT (OUTPATIENT)
Dept: PULMONOLOGY | Facility: CLINIC | Age: 49
End: 2024-10-18

## 2024-10-18 VITALS
HEART RATE: 68 BPM | DIASTOLIC BLOOD PRESSURE: 80 MMHG | HEIGHT: 74 IN | WEIGHT: 218 LBS | OXYGEN SATURATION: 98 % | SYSTOLIC BLOOD PRESSURE: 140 MMHG | BODY MASS INDEX: 27.98 KG/M2

## 2024-10-18 DIAGNOSIS — G47.33 OSA (OBSTRUCTIVE SLEEP APNEA): Primary | ICD-10-CM

## 2024-10-18 PROCEDURE — 3077F SYST BP >= 140 MM HG: CPT | Performed by: INTERNAL MEDICINE

## 2024-10-18 PROCEDURE — 3008F BODY MASS INDEX DOCD: CPT | Performed by: INTERNAL MEDICINE

## 2024-10-18 PROCEDURE — 3079F DIAST BP 80-89 MM HG: CPT | Performed by: INTERNAL MEDICINE

## 2024-10-18 PROCEDURE — 99244 OFF/OP CNSLTJ NEW/EST MOD 40: CPT | Performed by: INTERNAL MEDICINE

## 2024-10-18 NOTE — H&P
[facility], [address], [place]    Pulmonary consult     Nader Grady Guthrie Patient Status:  Specimen    1975 MRN [id number]   Location [facility], [address], [place] Attending No att. providers found   Hosp Day # 0 PCP Rebecca Montiel MD     Date:  10/18/2024    History provided by:patient  HPI:     Chief Complaint   Patient presents with    Consult     Sleep Apnea      HPI    This is a very pleasant 48-year-old male with history of HTN and he was diagnosed with moderate JS few years ago in year  with an AHI of 19 and he never got treated  He lost 30 pounds in the last few years with diet and exercise he organizes his sleep and overall he feels much better and he used to feel more snoring and apnea with during call which she cut down significantly    Minimal symptoms now with ESS of 4 but he would like to see if he still have some significant JS    In bed 11 PM to 6 AM and usually refreshed in the morning  Occasional snoring but no apnea  No significant nocturia  Mild daytime fatigue but no sleepiness with no sleep attack or sleep paralysis  Otherwise denied any sinus pressure or nasal congestion or chest pain or shortness of breath  The rest of his 12 point review of system are negative    History     Past Medical History:    Colon polyp    Polyps removed during colonoscopy in 2019    Hemorrhoids    Hemorrhoids inside rectum     Past Surgical History:   Procedure Laterality Date    Colonoscopy  2019     Family History   Problem Relation Age of Onset    Cancer Father 76        Acute Leukemia (active); Prostate Cancer (active)    Colon Polyps Father     Cancer Mother 34        Breast Cancer ()    Cancer Maternal Grandmother         unknown    Cancer Maternal Grandfather 90        Throat Cancer ()    Cancer Paternal Grandmother         unknown type    Genetic Disease Maternal Aunt         reportedly BRCA+ (did RRM)    Genetic Disease Maternal Aunt          reportedly BRCA+ (did RRM)     Social History:  Social History     Socioeconomic History    Marital status:    Tobacco Use    Smoking status: Never    Smokeless tobacco: Never   Substance and Sexual Activity    Alcohol use: Yes     Alcohol/week: 5.0 standard drinks of alcohol     Types: 5 Standard drinks or equivalent per week     Comment: occ    Drug use: Not Currently     Types: Cannabis     Allergies/Medications:   Allergies: Allergies[1]  (Not in a hospital admission)      Review of Systems:     Constitutional: Negative.    HENT: Negative.     Eyes: Negative.    Respiratory: Negative.     Cardiovascular: Negative.    Gastrointestinal: Negative.    Musculoskeletal: Negative.    Neurological: Negative.    Hematological: Negative.    Psychiatric/Behavioral: Negative.         Physical Exam:   Vital Signs:  Blood pressure 140/80, pulse 68, height 6' 2\" (1.88 m), weight 218 lb (98.9 kg), SpO2 98%.  Physical Exam  Constitutional:       General: He is not in acute distress.     Appearance: Normal appearance. He is not ill-appearing.   HENT:      Head: Atraumatic.      Nose: Nose normal.      Mouth/Throat:      Mouth: Mucous membranes are moist.      Comments: Upper airway class I Mallampati score  Eyes:      General: No scleral icterus.     Pupils: Pupils are equal, round, and reactive to light.   Cardiovascular:      Rate and Rhythm: Normal rate.      Heart sounds: No murmur heard.     No gallop.   Pulmonary:      Effort: No respiratory distress.      Breath sounds: No stridor. No wheezing, rhonchi or rales.   Chest:      Chest wall: No tenderness.   Abdominal:      General: Abdomen is flat. Bowel sounds are normal. There is no distension.      Palpations: Abdomen is soft.      Tenderness: There is no guarding.   Musculoskeletal:      Cervical back: Normal range of motion.      Right lower leg: No edema.      Left lower leg: No edema.   Skin:     General: Skin is dry.   Neurological:      Mental Status:  He is oriented to person, place, and time.           Results:     Lab Results   Component Value Date    WBC 4.1 09/27/2024    HGB 14.8 09/27/2024    HCT 43.1 09/27/2024    .0 09/27/2024    CREATSERUM 0.99 06/24/2024    BUN 13 06/24/2024     06/24/2024    K 4.4 06/24/2024     06/24/2024    CO2 27.0 06/24/2024    GLU 84 06/24/2024    CA 9.4 06/24/2024    ALB 4.7 09/27/2024    ALKPHO 43 (L) 09/27/2024    BILT 3.2 (H) 09/27/2024    TP 7.4 09/27/2024    AST 51 (H) 09/27/2024    ALT 28 09/27/2024    TSH 4.651 06/24/2024    B12 527 09/27/2024       Assessment/Plan:     1-suspected JS  Sleep study in 2020 in outside facility with AHI of 19  Lost 30 pounds in the last few years with diet and exercise  Almost normal BMI 28  Upper airway class I Mallampati score  ESS 4  Mild hypertension    Educated about JS  Plan ;  Home sleep study    Continue diet and exercise and weight reduction  Keep regular sleep-wake cycles  Avoid driving if sleepy  Avoid sedative and narcotic and alcohol              Ale Yoon MD  10/18/2024         [1] No Known Allergies

## 2024-11-11 ENCOUNTER — TELEPHONE (OUTPATIENT)
Facility: CLINIC | Age: 49
End: 2024-11-11

## 2024-11-11 NOTE — TELEPHONE ENCOUNTER
[facility]st Rem[facility]:     Reticulocyte Count (Order #[id number]) on 10/3/24     LDH (Order #[id number]) on 10/3/24     Haptoglobin (Order #[id number]) on 10/3/24

## 2024-12-19 ENCOUNTER — OFFICE VISIT (OUTPATIENT)
Dept: SLEEP CENTER | Age: 49
End: 2024-12-19
Attending: INTERNAL MEDICINE
Payer: COMMERCIAL

## 2024-12-19 DIAGNOSIS — G47.33 OSA (OBSTRUCTIVE SLEEP APNEA): ICD-10-CM

## 2024-12-19 PROCEDURE — 95806 SLEEP STUDY UNATT&RESP EFFT: CPT

## 2024-12-20 DIAGNOSIS — Z13.6 SCREENING FOR HEART DISEASE: ICD-10-CM

## 2024-12-20 DIAGNOSIS — R93.1 ELEVATED CORONARY ARTERY CALCIUM SCORE: ICD-10-CM

## 2024-12-20 DIAGNOSIS — Z00.00 ANNUAL PHYSICAL EXAM: ICD-10-CM

## 2024-12-20 DIAGNOSIS — D22.9 MULTIPLE PIGMENTED NEVI: ICD-10-CM

## 2024-12-20 DIAGNOSIS — R03.0 ELEVATED BLOOD PRESSURE READING: ICD-10-CM

## 2024-12-20 DIAGNOSIS — Z80.42 FAMILY HISTORY OF PROSTATE CANCER: ICD-10-CM

## 2024-12-20 DIAGNOSIS — G47.33 OSA (OBSTRUCTIVE SLEEP APNEA): ICD-10-CM

## 2024-12-20 RX ORDER — LOSARTAN POTASSIUM 25 MG/1
25 TABLET ORAL DAILY
Qty: 90 TABLET | Refills: 1 | Status: SHIPPED | OUTPATIENT
Start: 2024-12-20

## 2024-12-20 RX ORDER — ROSUVASTATIN CALCIUM 20 MG/1
20 TABLET, COATED ORAL NIGHTLY
Qty: 90 TABLET | Refills: 1 | Status: SHIPPED | OUTPATIENT
Start: 2024-12-20

## 2024-12-22 NOTE — PROCEDURES
[facility]  Accredited by the American Academy of Sleep Medicine (AASM)    PATIENT'S NAME: FRANCI VALDEZ   ATTENDING PHYSICIAN: Ale Yoon MD   REFERRING PHYSICIAN: Ale Yoon MD   PATIENT ACCOUNT #: [id number] LOCATION: Sleep Center   MEDICAL RECORD #: [id number] YOB: 1975   DATE OF STUDY: 12/19/2024       SLEEP STUDY REPORT    STUDY TYPE:  Home sleep test.    INDICATION:  Suspected obstructive sleep apnea (ICD-10 code G47.33) in patient with snoring, difficulty falling asleep, nocturnal gasping, night sweats, an McRae Sleepiness Scale score of 6/24, and a body mass index 28.0.    RESULTS:  The patient underwent home sleep test with measurement of his nasal air flow, nasal air pressure, snoring, chest and abdominal wall motion, oximetry, and body position.  I have reviewed the entirety of the raw data of this study.  During the study, the total recording time is 474 minutes.  The lights-out clock time is 11 p.m., the lights-on clock time is 6:55 a.m.  The apnea plus hypopnea index is 8.1 events per hour.  The supine apnea plus hypopnea index is 14.8 events per hour.  The average oxygen saturation is 94%, the lowest oxygen saturation is 89%, and the patient spent 0% of the test with saturations 88% or less.  The average heart rate is 64 beats per minute.  The patient spent approximately 60% of the test in the supine position.    INTERPRETATION:  The data generated from this study is consistent with mild obstructive sleep apnea which becomes moderate in the supine position (ICD-10 code G47.33).    RECOMMENDATIONS:    1.   CPAP titration.  2.   Weight loss.  3.   Avoid alcohol.  4.   Avoid sedating drug.  5.   The patient should not drive if at all sleepy.     Please do not hesitate to contact me if there is any question whatsoever regarding interpretation of this study.    Dictated By Mehul Ortega MD  d: 12/22/2024 10:55:30  t: 12/22/2024  13:27:13  [facility] [id number]  [facility]/    cc: MD Ale Graves MD

## 2025-01-09 ENCOUNTER — TELEPHONE (OUTPATIENT)
Facility: CLINIC | Age: 50
End: 2025-01-09

## 2025-01-09 DIAGNOSIS — Z86.0100 HX OF COLONIC POLYPS: Primary | ICD-10-CM

## 2025-01-09 DIAGNOSIS — Z12.11 SPECIAL SCREENING FOR MALIGNANT NEOPLASMS, COLON: ICD-10-CM

## 2025-01-09 NOTE — TELEPHONE ENCOUNTER
Rescheduled for:  Colonoscopy [address]3066  Provider Name:  Dr. Flowers  Date:  FROM 1/10/2025 TO 5/23/2025  Location:  [facility]  Sedation:  MAC  Time: FROM 8:15 am, TO 12:30pm(pt is aware that [facility] will call the day before to confirm arrival time)  Prep:  Suprep  Meds/Allergies Reconciled?:  DAO Armas reviewed  Diagnosis with codes:  Screening for colon cancer Z12.11; Hx of colon polyps Z86.010  Was patient informed to call insurance with codes (Y/N):  Yes  Referral sent?:  Referral was sent at the time of electronic surgical scheduling.  EM or [facility] notified?:  I sent an electronic request to Endo Scheduling and received a confirmation today.     Medication Orders:  N/A  Misc Orders:  N/A     Further instructions given by staff:  Prep instructions were given to pt in the office, pt verbalized understanding.

## 2025-01-09 NOTE — TELEPHONE ENCOUNTER
PPD-    Patient colonoscopy 75029 is now schedule don 5/23/2025 with Dr. Flowers at [facility], dx codes;  Screening for colon cancer Z12.11; Hx of colon polyps Z86.010    Thanks!

## 2025-01-22 ENCOUNTER — OFFICE VISIT (OUTPATIENT)
Dept: PULMONOLOGY | Facility: CLINIC | Age: 50
End: 2025-01-22
Payer: COMMERCIAL

## 2025-01-22 VITALS
HEIGHT: 74 IN | RESPIRATION RATE: 14 BRPM | OXYGEN SATURATION: 99 % | WEIGHT: 221.19 LBS | HEART RATE: 74 BPM | SYSTOLIC BLOOD PRESSURE: 137 MMHG | DIASTOLIC BLOOD PRESSURE: 94 MMHG | BODY MASS INDEX: 28.39 KG/M2

## 2025-01-22 DIAGNOSIS — G47.33 OSA (OBSTRUCTIVE SLEEP APNEA): Primary | ICD-10-CM

## 2025-01-22 PROCEDURE — 99213 OFFICE O/P EST LOW 20 MIN: CPT | Performed by: INTERNAL MEDICINE

## 2025-01-22 PROCEDURE — 3008F BODY MASS INDEX DOCD: CPT | Performed by: INTERNAL MEDICINE

## 2025-01-22 PROCEDURE — 3075F SYST BP GE 130 - 139MM HG: CPT | Performed by: INTERNAL MEDICINE

## 2025-01-22 PROCEDURE — 3080F DIAST BP >= 90 MM HG: CPT | Performed by: INTERNAL MEDICINE

## 2025-01-22 NOTE — PROGRESS NOTES
Subjective:   Patient ID: Nader Guthrie is a 49 year old male.    HPI  Presented today to discuss sleep apnea test  Overall doing well with no significant snoring or apnea with no gasping during sleep  Usually in bed 11 or 12 midnight to 8 AM and usually refreshed  No significant daytime sleepiness or fatigue  Doing well with diet and exercise and weight reduction  History/Other:   Review of Systems   Constitutional:  Negative for fatigue.   HENT:  Negative for congestion.    Respiratory: Negative.     Cardiovascular: Negative.    Neurological: Negative.      Current Outpatient Medications   Medication Sig Dispense Refill    LOSARTAN 25 MG Oral Tab TAKE 1 TABLET(25 MG) BY MOUTH DAILY 90 tablet 1    ROSUVASTATIN 20 MG Oral Tab TAKE 1 TABLET(20 MG) BY MOUTH EVERY NIGHT 90 tablet 1    Na Sulfate-K Sulfate-Mg Sulf (SUPREP BOWEL PREP KIT) 17.5-3.13-1.6 GM/177ML Oral Solution Take as directed by GI clinic. Okay to substitute for generic. (Patient not taking: Reported on 1/22/2025) 1 each 0     Allergies:Allergies[1]    Objective:   Physical Exam  Constitutional:       General: He is not in acute distress.     Appearance: Normal appearance. He is not ill-appearing.   HENT:      Head: Atraumatic.      Nose: Nose normal.      Mouth/Throat:      Mouth: Mucous membranes are moist.      Comments: Upper airway class I-II Mallampati score  Cardiovascular:      Rate and Rhythm: Normal rate.   Pulmonary:      Effort: Pulmonary effort is normal. No respiratory distress.      Breath sounds: Normal breath sounds. No stridor. No wheezing, rhonchi or rales.   Musculoskeletal:      Right lower leg: No edema.      Left lower leg: No edema.   Skin:     General: Skin is dry.   Neurological:      Mental Status: He is oriented to person, place, and time.             Sleep study 12/22/2024 reviewed :   INDICATION:  Suspected obstructive sleep apnea (ICD-10 code G47.33) in patient with snoring, difficulty falling asleep, nocturnal gasping,  night sweats, an Underwood Sleepiness Scale score of 6/24, and a body mass index 28.0.     RESULTS:  The patient underwent home sleep test with measurement of his nasal air flow, nasal air pressure, snoring, chest and abdominal wall motion, oximetry, and body position.  I have reviewed the entirety of the raw data of this study.  During the study, the total recording time is 474 minutes.  The lights-out clock time is 11 p.m., the lights-on clock time is 6:55 a.m.  The apnea plus hypopnea index is 8.1 events per hour.  The supine apnea plus hypopnea index is 14.8 events per hour.  The average oxygen saturation is 94%, the lowest oxygen saturation is 89%, and the patient spent 0% of the test with saturations 88% or less.  The average heart rate is 64 beats per minute.  The patient spent approximately 60% of the test in the supine position.     INTERPRETATION:  The data generated from this study is consistent with mild obstructive sleep apnea which becomes moderate in the supine position (ICD-10 code G47.33).     RECOMMENDATIONS:    1.       CPAP titration.  2.       Weight loss.  3.       Avoid alcohol.  4.       Avoid sedating drug.  5.       The patient should not drive if at all sleepy.      Assessment & Plan:   1. JS (obstructive sleep apnea)        1-  Mild JS ( AHI 8 )   Mild with AHI 8 become 14.8 in Supine position  Sleep study in 2020 in outside facility with AHI of 19  Lost 30 pounds in the last few years with diet and exercise  Almost normal BMI 28  Upper airway class I Mallampati score  ESS normal /overall asymptomatic  Mild hypertension     Plan :  No need for therapy now since asymptomatic , and doing well with diet and exercise with improvement of his sleep study from few years ago .  Recommend sleep on the side     Continue diet and exercise and weight reduction  Keep regular sleep-wake cycles  Avoid driving if sleepy  Avoid sedative and narcotic and alcohol    If start to gain more weight and become  more symptomatic to follow-up with me  Otherwise to follow-up as needed    Meds This Visit:  Requested Prescriptions      No prescriptions requested or ordered in this encounter       Imaging & Referrals:  None         [1] No Known Allergies

## 2025-05-23 PROBLEM — Z86.0100 HISTORY OF COLON POLYPS: Status: ACTIVE | Noted: 2025-05-23

## 2025-05-23 PROBLEM — Z12.11 SCREENING FOR COLORECTAL CANCER: Status: ACTIVE | Noted: 2025-05-23

## 2025-05-23 PROBLEM — Z12.12 SCREENING FOR COLORECTAL CANCER: Status: ACTIVE | Noted: 2025-05-23

## 2025-05-23 PROCEDURE — 88305 TISSUE EXAM BY PATHOLOGIST: CPT | Performed by: INTERNAL MEDICINE

## 2025-05-29 ENCOUNTER — TELEPHONE (OUTPATIENT)
Facility: CLINIC | Age: 50
End: 2025-05-29

## 2025-05-29 NOTE — TELEPHONE ENCOUNTER
5  year colonoscopy recall entered. Health maintenance updated.    Colonoscopy done on 5/23/25 and next due on 5/23/30.

## 2025-05-29 NOTE — TELEPHONE ENCOUNTER
----- Message from Teodoro Flowers sent at 5/27/2025  4:10 PM CDT -----  I left a message on the patient's personal voicemail.  He had a solitary subcentimeter tubular adenoma removed.  I discussed the significance.  He has also had colonic polyps removed previously.  I   recommended a high-fiber diet for diverticulosis and a surveillance colonoscopy in 5 years.  I have asked Nader to contact me with any questions.    GI RNs: Please enter colonoscopy recall for 5 years.  ----- Message -----  From: Lab, Background User  Sent: 5/24/2025  11:48 AM CDT  To: Teodoro Flowers MD

## 2025-07-03 ENCOUNTER — TELEPHONE (OUTPATIENT)
Dept: INTERNAL MEDICINE CLINIC | Facility: CLINIC | Age: 50
End: 2025-07-03

## 2025-07-03 DIAGNOSIS — Z13.6 SCREENING FOR HEART DISEASE: ICD-10-CM

## 2025-07-03 DIAGNOSIS — Z80.42 FAMILY HISTORY OF PROSTATE CANCER: ICD-10-CM

## 2025-07-03 DIAGNOSIS — G47.33 OSA (OBSTRUCTIVE SLEEP APNEA): ICD-10-CM

## 2025-07-03 DIAGNOSIS — Z00.00 ANNUAL PHYSICAL EXAM: ICD-10-CM

## 2025-07-03 DIAGNOSIS — R03.0 ELEVATED BLOOD PRESSURE READING: ICD-10-CM

## 2025-07-03 DIAGNOSIS — R93.1 ELEVATED CORONARY ARTERY CALCIUM SCORE: ICD-10-CM

## 2025-07-03 DIAGNOSIS — D22.9 MULTIPLE PIGMENTED NEVI: ICD-10-CM

## 2025-07-08 RX ORDER — LOSARTAN POTASSIUM 25 MG/1
25 TABLET ORAL DAILY
Qty: 30 TABLET | Refills: 0 | Status: SHIPPED | OUTPATIENT
Start: 2025-07-08

## 2025-07-08 NOTE — TELEPHONE ENCOUNTER
Please review; protocol failed/ has no protocol      Message sent for patient to make an appointment.

## 2025-07-14 NOTE — TELEPHONE ENCOUNTER
LOV: 04/09/2024  No future appt.  Medication was sent for 30 day supply.   When would you like to see this patient?

## (undated) NOTE — LETTER
11/11/2024          Nader Guthrie    [address] [address]         Dear Nader,    Our records indicate that the tests ordered for you by DAO Bernstein  have not been done.  If you have, in fact, already completed the tests or you do not wish to have the tests done, please contact our office at THE NUMBER LISTED BELOW.  Otherwise, please proceed with the testing.  Enclosed is a duplicate order for your convenience.    Reticulocyte Count (Order #[id number]) on 10/3/24     LDH (Order #[id number]) on 10/3/24     Haptoglobin (Order #[id number]) on 10/3/24     Sincerely,    DAO Bernstein  [facility], [facility]  [address]  [place] [id number] [phone]